# Patient Record
Sex: MALE | Race: WHITE | NOT HISPANIC OR LATINO | Employment: FULL TIME | URBAN - METROPOLITAN AREA
[De-identification: names, ages, dates, MRNs, and addresses within clinical notes are randomized per-mention and may not be internally consistent; named-entity substitution may affect disease eponyms.]

---

## 2020-02-07 ENCOUNTER — APPOINTMENT (OUTPATIENT)
Dept: GENERAL RADIOLOGY | Facility: HOSPITAL | Age: 50
End: 2020-02-07

## 2020-02-07 ENCOUNTER — HOSPITAL ENCOUNTER (EMERGENCY)
Facility: HOSPITAL | Age: 50
Discharge: HOME | End: 2020-02-07
Attending: EMERGENCY MEDICINE | Admitting: EMERGENCY MEDICINE

## 2020-02-07 VITALS
TEMPERATURE: 98.3 F | RESPIRATION RATE: 18 BRPM | SYSTOLIC BLOOD PRESSURE: 125 MMHG | WEIGHT: 275.57 LBS | OXYGEN SATURATION: 100 % | HEIGHT: 69 IN | BODY MASS INDEX: 40.82 KG/M2 | HEART RATE: 92 BPM | DIASTOLIC BLOOD PRESSURE: 89 MMHG

## 2020-02-07 DIAGNOSIS — S70.01XA CONTUSION OF RIGHT HIP, INITIAL ENCOUNTER: ICD-10-CM
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
## 2020-02-07 DIAGNOSIS — S13.9XXA CERVICAL SPRAIN, INITIAL ENCOUNTER: ICD-10-CM

## 2020-02-07 DIAGNOSIS — S33.5XXA LUMBAR SPRAIN, INITIAL ENCOUNTER: Primary | ICD-10-CM

## 2020-02-07 PROCEDURE — 99283 EMERGENCY DEPT VISIT LOW MDM: CPT

## 2020-02-07 PROCEDURE — 72110 X-RAY EXAM L-2 SPINE 4/>VWS: CPT

## 2020-02-07 PROCEDURE — 72050 X-RAY EXAM NECK SPINE 4/5VWS: CPT

## 2020-02-07 RX ORDER — METHOCARBAMOL 500 MG/1
500 TABLET, FILM COATED ORAL 4 TIMES DAILY
Qty: 12 TABLET | Refills: 0 | Status: SHIPPED | OUTPATIENT
Start: 2020-02-07 | End: 2020-06-08

## 2020-02-07 RX ORDER — IBUPROFEN 400 MG/1
800 TABLET ORAL ONCE
Status: COMPLETED | OUTPATIENT
Start: 2020-02-07 | End: 2020-02-07

## 2020-02-07 RX ORDER — TRAMADOL HYDROCHLORIDE 50 MG/1
50 TABLET ORAL EVERY 6 HOURS PRN
Qty: 12 TABLET | Refills: 0 | Status: SHIPPED | OUTPATIENT
Start: 2020-02-07

## 2020-02-07 RX ADMIN — IBUPROFEN 800 MG: 400 TABLET ORAL at 08:37

## 2020-02-07 NOTE — ED NOTES
Pt states that he was onsite at the [employer] this morning and slipped on the ice. Pt reports that his feet went up in the air and landed on his back and his flashlight and hit his head.pt did not lose pass out  Leyda Solis LPN  02/07/20 0842       Leyda Solis LPN  02/07/20 9819

## 2020-02-07 NOTE — DISCHARGE INSTRUCTIONS
Rest next 24 hours  No operational policing for the next 3 days, no heavy lifting bending stooping or pulling for the next 3 days  Medication as directed  Tylenol or ibuprofen also for discomfort  Follow-up with primary care provider

## 2020-02-07 NOTE — ED PROVIDER NOTES
Subjective   49-year-old male complaining of pain in his neck and shoulder as well as lower back.  The patient was working as a  when he got out on an icy road and fell at an accident scene.  He reports that he has had no radicular component to the pain reports no change in bowel or bladder habits or internal abdominal discomfort.  He reports no paresthesias or loss of consciousness          Review of Systems   Constitutional: Negative for chills, diaphoresis, fatigue and fever.   Musculoskeletal: Positive for back pain and neck pain.   Neurological: Negative for weakness, light-headedness and headaches.   All other systems reviewed and are negative.      History reviewed. No pertinent past medical history.    Allergies   Allergen Reactions   • Penicillins Hives       History reviewed. No pertinent surgical history.    No family history on file.    Social History     Socioeconomic History   • Marital status:      Spouse name: Not on file   • Number of children: Not on file   • Years of education: Not on file   • Highest education level: Not on file           Objective   Physical Exam  Alert [place] Coma Scale 15   HEENT: Pupils equal and reactive to light. Conjunctivae are not injected. normal tympanic membranes. Oropharynx and nares are normal.   Neck: Supple. Midline trachea. No JVD. No goiter.  Under right-sided trapezius and sternocleidomastoid muscle area.  No posterior midline tenderness  Chest: Clear and equal breath sounds bilaterally regular rate and rhythm without murmur or rub.  Nontender chest wall   Abdomen: Positive bowel sounds nontender nondistended. No rebound or peritoneal signs. No CVA tenderness.   Back: Right-sided lumbar paraspinal discomfort is noted.  Extends down onto the posterior right sacral and pelvis area.  There is no palpable fracture or bony crepitance  Extremities no clubbing cyanosis or edema motor sensory exam is normal the full range of motion is intact  negative straight leg raising test   skin: Warm and dry, no rashes or petechia.   Lymphatic: No regional lymphadenopathy. No calf pain, swelling or Jesus Manuel's sign    Procedures           ED Course                 Labs Reviewed   URINALYSIS W/ MICROSCOPIC IF INDICATED (NO CULTURE)     Medications   ibuprofen (ADVIL,MOTRIN) tablet 800 mg (800 mg Oral Given 2/7/20 0837)     Xr Spine Cervical Complete 4 Or 5 View    Result Date: 2/7/2020   1. Thickening of the prevertebral soft tissues in the lower cervical spine is nonspecific. Consider dedicated CT cervical spine for further evaluation. 2. No acute cervical spine fracture or subluxation is seen on this examination. 3. Degenerative disc and endplate changes at C5-6. Electronically Signed By-Dr. Vee Ga MD On:2/7/2020 9:22 AM This report was finalized on [id number] by Dr. Vee Ga MD.    Xr Spine Lumbar Complete 4+vw    Result Date: 2/7/2020   1. No acute lumbar spine findings. 2. Degenerative disc changes at L5-S1. 3 mm retrolisthesis L5 upon S1 may be related to degenerative facet arthropathy. 3. Degenerative disc and endplate changes at L1-2.  Electronically Signed By-Dr. Vee Ga MD On:2/7/2020 9:24 AM This report was finalized on [id number] by Dr. Vee Ga MD.                                     MDM  Number of Diagnoses or Management Options     Amount and/or Complexity of Data Reviewed  Tests in the radiology section of CPT®: reviewed  Independent visualization of images, tracings, or specimens: yes    Risk of Complications, Morbidity, and/or Mortality  Presenting problems: high  Diagnostic procedures: high  Management options: high  General comments: Patient will be discharged a prescription for tramadol and methocarbamol.  The patient was stable at discharge and vocalized understanding of discharge instructions and warning        Final diagnoses:   Lumbar sprain, initial encounter   Contusion of right hip, initial encounter    Cervical sprain, initial encounter   Fall from slipping on ice, initial encounter            David Condon MD  02/07/20 1029

## 2020-02-12 ENCOUNTER — TRANSCRIBE ORDERS (OUTPATIENT)
Dept: PHYSICAL THERAPY | Facility: CLINIC | Age: 50
End: 2020-02-12

## 2020-02-12 DIAGNOSIS — S30.0XXA LUMBAR CONTUSION, INITIAL ENCOUNTER: Primary | ICD-10-CM

## 2020-02-12 DIAGNOSIS — S70.00XA CONTUSION OF HIP, UNSPECIFIED LATERALITY, INITIAL ENCOUNTER: ICD-10-CM

## 2020-02-24 ENCOUNTER — TREATMENT (OUTPATIENT)
Dept: PHYSICAL THERAPY | Facility: CLINIC | Age: 50
End: 2020-02-24

## 2020-02-24 DIAGNOSIS — M54.2 PAIN, NECK: Primary | ICD-10-CM

## 2020-02-24 DIAGNOSIS — M54.50 ACUTE RIGHT-SIDED LOW BACK PAIN WITHOUT SCIATICA: ICD-10-CM

## 2020-02-24 PROCEDURE — 97162 PT EVAL MOD COMPLEX 30 MIN: CPT | Performed by: PHYSICAL THERAPIST

## 2020-02-24 PROCEDURE — 97110 THERAPEUTIC EXERCISES: CPT | Performed by: PHYSICAL THERAPIST

## 2020-02-24 PROCEDURE — 97140 MANUAL THERAPY 1/> REGIONS: CPT | Performed by: PHYSICAL THERAPIST

## 2020-02-24 NOTE — PROGRESS NOTES
Physical Therapy Initial Evaluation and Plan of Care    Patient: Jun Delacruz   : 1970  Diagnosis/ICD-10 Code:  Pain, neck [M54.2]  Referring practitioner: David Condon MD  Date of Initial Visit: 2020  Today's Date: 2020  Patient seen for 1 sessions           Subjective Questionnaire:   Oswestry: 20%    Subjective Evaluation    History of Present Illness  Date of onset: 2020  Mechanism of injury: Fall on ice onto flashlight in back pocket    Subjective comment: Pt is a 48 yo male who reports to clinic following a fall on ice resulting in neck and low back pain.  Pt is a  who fell on ice onto his flashlight in his back right pocket.  He notes no LOC during fall.  Pt now has pain localized to his right lower back and bilateral neck.  Pt denies radicular symptoms or bowel/bladder changes. Pain  Current pain ratin  At best pain ratin  At worst pain ratin  Quality: sharp  Relieving factors: heat, ice and medications  Aggravating factors: movement, repetitive movement and prolonged positioning  Progression: improved    Patient Goals  Patient goals for therapy: decreased pain, increased motion, increased strength and return to work             Objective       Palpation   Left   Tenderness of the cervical paraspinals, levator scapulae and upper trapezius.     Right Tenderness of the cervical paraspinals, levator scapulae, quadratus lumborum and upper trapezius.     Additional Palpation Details  Tenderness noted in the lower right quadrant of the back, just to the right of midline.    Active Range of Motion   Cervical/Thoracic Spine   Cervical  Subcranial protraction: WFL   Subcranial retraction: WFL     Lumbar   Normal active range of motion    Additional Active Range of Motion Details  AROM WFL however painful.   AROM WFL however painful.    Passive Range of Motion   Cervical/Thoracic Spine   Cervical   Subcranial protraction: WFL   Subcranial retraction: WFL     Lumbar    Normal passive range of motion    Additional Passive Range of Motion Details  PIVM hypomobile    PIVM is hypomobile.     Strength/Myotome Testing     Lumbar   Left   Normal strength    Right   Normal strength         Assessment & Plan     Assessment  Impairments: activity intolerance, lacks appropriate home exercise program and pain with function  Assessment details: Pt presents to clinic with c/o neck pain and low back pain.  Pt demos AROM WFL however is painful and tight.  Inflammation noted in the right lower back from previous bruising. Neck tightness noted in the upper trap and levator.  Pt would benefit from therapy in order to reduced pain and muscle tightness. Progress pt with additional manual therapy and STM along with core strengthening/stabilization activities.   Functional Limitations: carrying objects, lifting and uncomfortable because of pain  Goals  Plan Goals: Short term goals, 1 week: Tolerate HEP progression.  Voice compliance with activity modification.  Report improvement in symptoms.    Long term goals, 6 weeks:   Pt reported symptom free during everyday activity.   Improvement in Oswestry score to <10%.  Increased core activation and stabilization during movement.    Plan  Therapy options: will be seen for skilled physical therapy services  Other planned modality interventions: modalities PRN  Planned therapy interventions: therapeutic activities, stretching, strengthening, spinal/joint mobilization, soft tissue mobilization, manual therapy, joint mobilization, home exercise program, functional ROM exercises, flexibility and abdominal trunk stabilization  Frequency: 2x week  Plan details: 30 visits per POC, 90 day certification period          Timed:         Manual Therapy:    10     mins  90134;     Therapeutic Exercise:    10     mins  81849;     Neuromuscular Fady:        mins  75195;    Therapeutic Activity:          mins  95580;     Gait Training:           mins  18975;      Ultrasound:         mins  63156;    Ionto                                   mins   60334  Self Care                            mins   41716    Un-Timed:  Electrical Stimulation:         mins  15373 ( );  Traction          mins 00766  Low Eval          Mins  94891  Mod Eval     30     Mins  39138  High Eval                            Mins  49823  Re-Eval                               mins  73100        Timed Treatment:   20   mins   Total Treatment:     50   mins    PT SIGNATURE: ESTEPHANIE Verde, SPT  Physical Therapy Student  DATE TREATMENT INITIATED: 2/24/2020    Initial Certification  Certification Period: 5/24/2020  I certify that the therapy services are furnished while this patient is under my care.  The services outlined above are required by this patient, and will be reviewed every 90 days.     PHYSICIAN: _____________________________    David Condon MD      DATE:     Please sign and return via fax to [phone].. Thank you, [facility].

## 2020-02-27 ENCOUNTER — TREATMENT (OUTPATIENT)
Dept: PHYSICAL THERAPY | Facility: CLINIC | Age: 50
End: 2020-02-27

## 2020-02-27 DIAGNOSIS — M54.2 PAIN, NECK: Primary | ICD-10-CM

## 2020-02-27 DIAGNOSIS — M54.50 ACUTE RIGHT-SIDED LOW BACK PAIN WITHOUT SCIATICA: ICD-10-CM

## 2020-02-27 PROCEDURE — 97140 MANUAL THERAPY 1/> REGIONS: CPT | Performed by: PHYSICAL THERAPIST

## 2020-02-27 PROCEDURE — 97110 THERAPEUTIC EXERCISES: CPT | Performed by: PHYSICAL THERAPIST

## 2020-02-27 NOTE — PROGRESS NOTES
Physical Therapy Daily Progress Note      Patient: Jun Delacruz   : 1970  Diagnosis/ICD-10 Code:  Pain, neck [M54.2]  Referring practitioner: David Condon MD  Date of Initial Visit: Type: THERAPY  Noted: 2020  Today's Date: 2020  Patient seen for 2 sessions             Subjective Pt reports feeling better after his initial visit.    Objective   See Exercise, Manual, and Modality Logs for complete treatment.       Assessment/Plan  Good tolerance with manual techniques to cervical and lumbar regions.  He also tolerated addition of exercises well overall, but did have slight c/o cramping in his hamstring when he initiated piriformis stretch.    Progress per Plan of Care           Timed:         Manual Therapy:   20     mins  28053;     Therapeutic Exercise:    30     mins  73548;     Neuromuscular Fady:        mins  80528;    Therapeutic Activity:          mins  56660;     Gait Training:           mins  82723;     Ultrasound:          mins  00568;    Ionto                                   mins   67290  Self Care                            mins   13214      Un-Timed:  Electrical Stimulation:         mins  61413 ( );  Dry Needling          mins self-pay  Traction          mins 51848      Timed Treatment:   50   mins   Total Treatment:     50   mins    Ze Marquez PTA  Physical Therapist Assistant

## 2020-03-04 ENCOUNTER — TREATMENT (OUTPATIENT)
Dept: PHYSICAL THERAPY | Facility: CLINIC | Age: 50
End: 2020-03-04

## 2020-03-04 DIAGNOSIS — M54.2 PAIN, NECK: Primary | ICD-10-CM

## 2020-03-04 DIAGNOSIS — M54.50 ACUTE RIGHT-SIDED LOW BACK PAIN WITHOUT SCIATICA: ICD-10-CM

## 2020-03-04 PROCEDURE — 97110 THERAPEUTIC EXERCISES: CPT | Performed by: PHYSICAL THERAPIST

## 2020-03-04 PROCEDURE — 97140 MANUAL THERAPY 1/> REGIONS: CPT | Performed by: PHYSICAL THERAPIST

## 2020-03-04 NOTE — PROGRESS NOTES
Physical Therapy Daily Progress Note      Patient: Jun Delacruz   : 1970  Diagnosis/ICD-10 Code:  Pain, neck [M54.2]  Referring practitioner: David Condon MD  Date of Initial Visit: Type: THERAPY  Noted: 2020  Today's Date: 3/4/2020  Patient seen for 3 sessions             Subjective Pt says that the morning after his last visit, he was having sciatica symptoms in his left glut and leg.  He says that he is not having as much pain in his right low back, but he does have soreness in bilateral UT and base of his neck.    Objective   See Exercise, Manual, and Modality Logs for complete treatment.       Assessment/Plan  Pt tolerated addition of STM to left piriformis well overall today.  He also tolerated changes to exercises, having a reduction of resistance for t-band hip strengthening.    Progress per Plan of Care           Timed:         Manual Therapy:    15     mins  56614;     Therapeutic Exercise:    15     mins  44107;     Neuromuscular Fady:        mins  30783;    Therapeutic Activity:          mins  70759;     Gait Training:           mins  36494;     Ultrasound:          mins  68863;    Ionto                                   mins   49324  Self Care                            mins   08292      Un-Timed:  Electrical Stimulation:         mins  60668 ( );  Dry Needling          mins self-pay  Traction          mins 74741      Timed Treatment:  30    mins   Total Treatment:     30   mins    Ze Marquez PTA  Physical Therapist Assistant

## 2020-03-05 ENCOUNTER — TREATMENT (OUTPATIENT)
Dept: PHYSICAL THERAPY | Facility: CLINIC | Age: 50
End: 2020-03-05

## 2020-03-05 DIAGNOSIS — M54.50 ACUTE RIGHT-SIDED LOW BACK PAIN WITHOUT SCIATICA: ICD-10-CM

## 2020-03-05 DIAGNOSIS — M54.2 PAIN, NECK: Primary | ICD-10-CM

## 2020-03-05 PROCEDURE — 97110 THERAPEUTIC EXERCISES: CPT | Performed by: PHYSICAL THERAPIST

## 2020-03-05 NOTE — PROGRESS NOTES
Physical Therapy Daily Progress Note    VISIT#: 4    Subjective   Pt reports: to clinic with c/o soreness from HEP. Pt acknowledges compliance to HEP at this time.       Objective     See Exercise, Manual, and Modality Logs for complete treatment.     Patient Education: HEP    Assessment/Plan  Pt able to tolerate therapy including therex for core strenght and stability along with neck musculature strengthening.  Pt demos improvements in hip and core strength.  Pt would benefit from additional therapy at this time in order to reduce LBP and neck pain.     Progress per Plan of Care            Timed:         Manual Therapy:         mins  10157;     Therapeutic Exercise:    30     mins  58527;     Neuromuscular Fady:        mins  21706;    Therapeutic Activity:          mins  77579;     Gait Training:           mins  99043;     Ultrasound:          mins  52184;    Ionto                                   mins   91190  Self Care                            mins   21086    Un-Timed:  Electrical Stimulation:         mins  77220 ( );  Traction          mins 94828  Low Eval          Mins  88855  Mod Eval          Mins  95226  High Eval                            Mins  83254  Re-Eval                               mins  06007    Timed Treatment:   30   mins   Total Treatment:     30   mins    PT Signature: ESTEPHANIE Verde, ANITA  Physical Therapy Student

## 2020-03-10 ENCOUNTER — TREATMENT (OUTPATIENT)
Dept: PHYSICAL THERAPY | Facility: CLINIC | Age: 50
End: 2020-03-10

## 2020-03-10 DIAGNOSIS — M54.50 ACUTE RIGHT-SIDED LOW BACK PAIN WITHOUT SCIATICA: ICD-10-CM

## 2020-03-10 DIAGNOSIS — M54.2 PAIN, NECK: Primary | ICD-10-CM

## 2020-03-10 PROCEDURE — 97110 THERAPEUTIC EXERCISES: CPT | Performed by: PHYSICAL THERAPIST

## 2020-03-10 PROCEDURE — 97140 MANUAL THERAPY 1/> REGIONS: CPT | Performed by: PHYSICAL THERAPIST

## 2020-03-10 NOTE — PROGRESS NOTES
Physical Therapy Daily Progress Note      Patient: Jun Delacruz   : 1970  Diagnosis/ICD-10 Code:  Pain, neck [M54.2]  Referring practitioner: David Condon MD  Date of Initial Visit: Type: THERAPY  Noted: 2020  Today's Date: 3/10/2020  Patient seen for 5 sessions             Subjective Pt says that his neck is feeling fine.  His main issue is in his bilateral low back.    Objective   See Exercise, Manual, and Modality Logs for complete treatment.       Assessment/Plan  Pt had good response with manual STM to bilateral low back.  He also responded well with progression and addition of strengthening and stretching exercises.    Progress per Plan of Care           Timed:         Manual Therapy:   10    mins  73105;     Therapeutic Exercise:    20     mins  00766;     Neuromuscular Fady:        mins  04116;    Therapeutic Activity:          mins  99890;     Gait Training:           mins  72223;     Ultrasound:          mins  07865;    Ionto                                   mins   28081  Self Care                            mins   27909      Un-Timed:  Electrical Stimulation:         mins  73325 ( );  Dry Needling          mins self-pay  Traction          mins 20283      Timed Treatment:   30   mins   Total Treatment:     30   mins    Ze Marquez PTA  Physical Therapist Assistant

## 2020-03-12 ENCOUNTER — TREATMENT (OUTPATIENT)
Dept: PHYSICAL THERAPY | Facility: CLINIC | Age: 50
End: 2020-03-12

## 2020-03-12 DIAGNOSIS — M54.50 ACUTE RIGHT-SIDED LOW BACK PAIN WITHOUT SCIATICA: ICD-10-CM

## 2020-03-12 DIAGNOSIS — M54.2 PAIN, NECK: Primary | ICD-10-CM

## 2020-03-12 PROCEDURE — 97110 THERAPEUTIC EXERCISES: CPT | Performed by: PHYSICAL THERAPIST

## 2020-03-12 PROCEDURE — 97140 MANUAL THERAPY 1/> REGIONS: CPT | Performed by: PHYSICAL THERAPIST

## 2020-03-12 NOTE — PROGRESS NOTES
Physical Therapy Daily Progress Note      Patient: Jun Delacruz   : 1970  Diagnosis/ICD-10 Code:  Pain, neck [M54.2]  Referring practitioner: David Condon MD  Date of Initial Visit: Type: THERAPY  Noted: 2020  Today's Date: 3/12/2020  Patient seen for 6 sessions             Subjective Pt reports that his back was feeling better until he was on the job yesterday and had to stand for about an hour straight.    Objective   See Exercise, Manual, and Modality Logs for complete treatment.       Assessment/Plan  Pt tolerated progression of resistance exercises well today, having decreased c/o pain overall.      Progress per Plan of Care           Timed:         Manual Therapy:    15     mins  88355;     Therapeutic Exercise:    8     mins  83160;     Neuromuscular Fady:        mins  71262;    Therapeutic Activity:          mins  76218;     Gait Training:           mins  18833;     Ultrasound:          mins  22867;    Ionto                                   mins   87871  Self Care                            mins   55004      Un-Timed:  Electrical Stimulation:         mins  80223 ( );  Dry Needling          mins self-pay  Traction          mins 78452      Timed Treatment:   23   mins   Total Treatment:     23   mins    Ze Marquez PTA  Physical Therapist Assistant

## 2020-03-17 ENCOUNTER — TREATMENT (OUTPATIENT)
Dept: PHYSICAL THERAPY | Facility: CLINIC | Age: 50
End: 2020-03-17

## 2020-03-17 DIAGNOSIS — M54.50 ACUTE RIGHT-SIDED LOW BACK PAIN WITHOUT SCIATICA: ICD-10-CM

## 2020-03-17 DIAGNOSIS — M54.2 PAIN, NECK: Primary | ICD-10-CM

## 2020-03-17 PROCEDURE — 97110 THERAPEUTIC EXERCISES: CPT | Performed by: PHYSICAL THERAPIST

## 2020-03-17 PROCEDURE — 97140 MANUAL THERAPY 1/> REGIONS: CPT | Performed by: PHYSICAL THERAPIST

## 2020-03-17 NOTE — PROGRESS NOTES
Physical Therapy Daily Progress Note      Patient: Jun Delacruz   : 1970  Diagnosis/ICD-10 Code:  Pain, neck [M54.2]  Referring practitioner: David Condon MD  Date of Initial Visit: Type: THERAPY  Noted: 2020  Today's Date: 3/17/2020  Patient seen for 7 sessions             Subjective Pt says that he had to change a flat tire on his way to therapy today.  He reports that his back is a little sore but overall he tolerated changing the tire well.    Objective   See Exercise, Manual, and Modality Logs for complete treatment.       Assessment/Plan  Pt had good tolerance with manual techniques and progression of strengthening exercises.      Progress per Plan of Care           Timed:         Manual Therapy:    10    mins  39480;     Therapeutic Exercise:    28     mins  92083;     Neuromuscular Fady:        mins  27979;    Therapeutic Activity:          mins  92445;     Gait Training:           mins  33226;     Ultrasound:          mins  42354;    Ionto                                   mins   53075  Self Care                            mins   83908      Un-Timed:  Electrical Stimulation:         mins  44552 ( );  Dry Needling          mins self-pay  Traction          mins 77138      Timed Treatment:   38   mins   Total Treatment:     38   mins    Ze Marquez PTA  Physical Therapist Assistant

## 2020-03-19 ENCOUNTER — TREATMENT (OUTPATIENT)
Dept: PHYSICAL THERAPY | Facility: CLINIC | Age: 50
End: 2020-03-19

## 2020-03-19 DIAGNOSIS — M54.50 ACUTE RIGHT-SIDED LOW BACK PAIN WITHOUT SCIATICA: ICD-10-CM

## 2020-03-19 DIAGNOSIS — M54.2 PAIN, NECK: Primary | ICD-10-CM

## 2020-03-19 PROCEDURE — 97110 THERAPEUTIC EXERCISES: CPT | Performed by: PHYSICAL THERAPIST

## 2020-03-19 NOTE — PROGRESS NOTES
Physical Therapy Daily Progress Note      Patient: Jun Delacruz   : 1970  Diagnosis/ICD-10 Code:  Pain, neck [M54.2]  Referring practitioner: David Condon MD  Date of Initial Visit: Type: THERAPY  Noted: 2020  Today's Date: 3/19/2020  Patient seen for 8 sessions             Subjective Pt reports that his back is continuing to feel better.    Objective   See Exercise, Manual, and Modality Logs for complete treatment.       Assessment/Plan  Pt had very good response with progression and addition of exercises, not having any c/o pain throughout treatment.    Progress per Plan of Care           Timed:         Manual Therapy:         mins  38050;     Therapeutic Exercise:    53     mins  17401;     Neuromuscular Fady:        mins  94366;    Therapeutic Activity:          mins  25889;     Gait Training:           mins  89880;     Ultrasound:          mins  68919;    Ionto                                   mins   24993  Self Care                            mins   64084      Un-Timed:  Electrical Stimulation:         mins  59139 (MC );  Dry Needling          mins self-pay  Traction          mins 37182      Timed Treatment:   53   mins   Total Treatment:     53   mins    Ze Marquez PTA  Physical Therapist Assistant

## 2021-02-01 ENCOUNTER — DOCUMENTATION (OUTPATIENT)
Dept: PHYSICAL THERAPY | Facility: CLINIC | Age: 51
End: 2021-02-01

## 2021-02-01 DIAGNOSIS — M54.2 PAIN, NECK: ICD-10-CM

## 2021-02-01 DIAGNOSIS — M54.50 ACUTE RIGHT-SIDED LOW BACK PAIN WITHOUT SCIATICA: Primary | ICD-10-CM

## 2021-02-01 NOTE — PROGRESS NOTES
Discharge Report    Patient Name: Jun Delacruz       Patient MRN: [id number]  : 1970  Physician: No ref. provider found  Date: 2021          Assessment:   Progress towards goals: Partially Met    Discharge Reason: Patient did not attend final appointment      Plan of Care: Patient to return to referring/providing physician    Prognosis: fair    Understanding at Discharge: n/a    Refugio Dela Cruz, PT, DPT, OCS  [place] license: [id number]  Physical Therapist